# Patient Record
Sex: FEMALE | Race: WHITE | NOT HISPANIC OR LATINO | Employment: UNEMPLOYED | URBAN - METROPOLITAN AREA
[De-identification: names, ages, dates, MRNs, and addresses within clinical notes are randomized per-mention and may not be internally consistent; named-entity substitution may affect disease eponyms.]

---

## 2024-01-18 ENCOUNTER — APPOINTMENT (OUTPATIENT)
Dept: CARDIOLOGY | Facility: CLINIC | Age: 54
End: 2024-01-18
Payer: COMMERCIAL

## 2024-01-18 ENCOUNTER — TELEPHONE (OUTPATIENT)
Dept: CARDIOLOGY | Facility: CLINIC | Age: 54
End: 2024-01-18

## 2024-01-18 ENCOUNTER — OFFICE VISIT (OUTPATIENT)
Dept: CARDIOLOGY | Facility: CLINIC | Age: 54
End: 2024-01-18
Payer: COMMERCIAL

## 2024-01-18 VITALS
HEART RATE: 95 BPM | DIASTOLIC BLOOD PRESSURE: 80 MMHG | WEIGHT: 192.5 LBS | SYSTOLIC BLOOD PRESSURE: 116 MMHG | HEIGHT: 64 IN | BODY MASS INDEX: 32.87 KG/M2

## 2024-01-18 DIAGNOSIS — J44.9 CHRONIC OBSTRUCTIVE PULMONARY DISEASE, UNSPECIFIED COPD TYPE (MULTI): ICD-10-CM

## 2024-01-18 DIAGNOSIS — Z95.1 HX OF CABG: ICD-10-CM

## 2024-01-18 DIAGNOSIS — Z76.89 ESTABLISHING CARE WITH NEW DOCTOR, ENCOUNTER FOR: ICD-10-CM

## 2024-01-18 DIAGNOSIS — I10 PRIMARY HYPERTENSION: ICD-10-CM

## 2024-01-18 DIAGNOSIS — I25.110 CORONARY ARTERY DISEASE INVOLVING NATIVE CORONARY ARTERY OF NATIVE HEART WITH UNSTABLE ANGINA PECTORIS (MULTI): ICD-10-CM

## 2024-01-18 DIAGNOSIS — F17.200 CURRENT SMOKER: ICD-10-CM

## 2024-01-18 DIAGNOSIS — E11.9 TYPE 2 DIABETES MELLITUS WITHOUT COMPLICATION, UNSPECIFIED WHETHER LONG TERM INSULIN USE (MULTI): ICD-10-CM

## 2024-01-18 DIAGNOSIS — E66.9 OBESITY (BMI 30.0-34.9): ICD-10-CM

## 2024-01-18 DIAGNOSIS — E78.2 MIXED HYPERLIPIDEMIA: ICD-10-CM

## 2024-01-18 PROBLEM — I25.10 CAD (CORONARY ARTERY DISEASE): Status: ACTIVE | Noted: 2024-01-18

## 2024-01-18 PROBLEM — E78.5 HLD (HYPERLIPIDEMIA): Status: ACTIVE | Noted: 2024-01-18

## 2024-01-18 PROCEDURE — 93000 ELECTROCARDIOGRAM COMPLETE: CPT | Performed by: INTERNAL MEDICINE

## 2024-01-18 PROCEDURE — 3074F SYST BP LT 130 MM HG: CPT | Performed by: INTERNAL MEDICINE

## 2024-01-18 PROCEDURE — 3079F DIAST BP 80-89 MM HG: CPT | Performed by: INTERNAL MEDICINE

## 2024-01-18 PROCEDURE — 99205 OFFICE O/P NEW HI 60 MIN: CPT | Performed by: INTERNAL MEDICINE

## 2024-01-18 RX ORDER — METOPROLOL TARTRATE 50 MG/1
50 TABLET ORAL 2 TIMES DAILY
COMMUNITY
Start: 2024-01-11 | End: 2024-01-18 | Stop reason: SDUPTHER

## 2024-01-18 RX ORDER — INSULIN LISPRO 100 [IU]/ML
INJECTION, SOLUTION INTRAVENOUS; SUBCUTANEOUS
COMMUNITY
Start: 2023-12-08

## 2024-01-18 RX ORDER — OMEPRAZOLE 40 MG/1
40 CAPSULE, DELAYED RELEASE ORAL DAILY
COMMUNITY

## 2024-01-18 RX ORDER — IBUPROFEN 800 MG/1
800 TABLET ORAL 3 TIMES DAILY
COMMUNITY
Start: 2024-01-11

## 2024-01-18 RX ORDER — ROPINIROLE 4 MG/1
8 TABLET, FILM COATED ORAL NIGHTLY
COMMUNITY

## 2024-01-18 RX ORDER — METHOCARBAMOL 500 MG/1
500 TABLET, FILM COATED ORAL 3 TIMES DAILY PRN
COMMUNITY
Start: 2023-12-16

## 2024-01-18 RX ORDER — VENLAFAXINE HYDROCHLORIDE 150 MG/1
150 CAPSULE, EXTENDED RELEASE ORAL DAILY
COMMUNITY
Start: 2024-01-11

## 2024-01-18 RX ORDER — METOPROLOL TARTRATE 100 MG/1
100 TABLET ORAL 2 TIMES DAILY
Qty: 180 TABLET | Refills: 3 | Status: SHIPPED | OUTPATIENT
Start: 2024-01-18

## 2024-01-18 RX ORDER — NITROGLYCERIN 0.4 MG/1
0.4 TABLET SUBLINGUAL EVERY 5 MIN PRN
COMMUNITY
Start: 2024-01-12

## 2024-01-18 RX ORDER — ASPIRIN 81 MG/1
81 TABLET ORAL DAILY
COMMUNITY

## 2024-01-18 RX ORDER — CLOPIDOGREL BISULFATE 75 MG/1
75 TABLET ORAL DAILY
COMMUNITY

## 2024-01-18 RX ORDER — ISOSORBIDE MONONITRATE 60 MG/1
60 TABLET, EXTENDED RELEASE ORAL DAILY
COMMUNITY
Start: 2024-01-11 | End: 2024-01-30 | Stop reason: SDUPTHER

## 2024-01-18 RX ORDER — ONDANSETRON 4 MG/1
TABLET, FILM COATED ORAL
COMMUNITY
Start: 2023-08-31

## 2024-01-18 RX ORDER — ATORVASTATIN CALCIUM 80 MG/1
80 TABLET, FILM COATED ORAL DAILY
COMMUNITY
Start: 2024-01-11

## 2024-01-18 RX ORDER — PREGABALIN 75 MG/1
150 CAPSULE ORAL 2 TIMES DAILY
COMMUNITY
Start: 2024-01-12

## 2024-01-18 RX ORDER — PEN NEEDLE, DIABETIC 31 GX5/16"
NEEDLE, DISPOSABLE MISCELLANEOUS
COMMUNITY
Start: 2023-12-08

## 2024-01-18 RX ORDER — INSULIN GLARGINE 100 [IU]/ML
INJECTION, SOLUTION SUBCUTANEOUS
COMMUNITY
Start: 2023-11-08

## 2024-01-18 RX ORDER — RANOLAZINE 500 MG/1
TABLET, EXTENDED RELEASE ORAL
COMMUNITY
Start: 2024-01-11

## 2024-01-18 RX ORDER — HYDROXYZINE PAMOATE 25 MG/1
25 CAPSULE ORAL 3 TIMES DAILY PRN
COMMUNITY
Start: 2024-01-12 | End: 2024-02-26 | Stop reason: WASHOUT

## 2024-01-18 RX ORDER — PROMETHAZINE HYDROCHLORIDE AND DEXTROMETHORPHAN HYDROBROMIDE 6.25; 15 MG/5ML; MG/5ML
SYRUP ORAL
COMMUNITY
Start: 2023-12-05 | End: 2024-01-30 | Stop reason: WASHOUT

## 2024-01-18 RX ORDER — TRAMADOL HYDROCHLORIDE 50 MG/1
50 TABLET ORAL EVERY 12 HOURS PRN
COMMUNITY
Start: 2023-11-30

## 2024-01-18 RX ORDER — LANOLIN ALCOHOL/MO/W.PET/CERES
1 CREAM (GRAM) TOPICAL NIGHTLY
COMMUNITY
Start: 2024-01-11

## 2024-01-18 ASSESSMENT — ENCOUNTER SYMPTOMS
NEAR-SYNCOPE: 0
HEMATOLOGIC/LYMPHATIC NEGATIVE: 1
NEUROLOGICAL NEGATIVE: 1
COUGH: 0
ALLERGIC/IMMUNOLOGIC NEGATIVE: 1
PSYCHIATRIC NEGATIVE: 1
PND: 0
ORTHOPNEA: 0
GASTROINTESTINAL NEGATIVE: 1
SHORTNESS OF BREATH: 1
SPUTUM PRODUCTION: 0
SLEEP DISTURBANCES DUE TO BREATHING: 0
ENDOCRINE NEGATIVE: 1
DYSPNEA ON EXERTION: 1
CONSTITUTIONAL NEGATIVE: 1
MUSCULOSKELETAL NEGATIVE: 1
HEMOPTYSIS: 0
SNORING: 0
SYNCOPE: 0
CLAUDICATION: 0
PALPITATIONS: 1
EYES NEGATIVE: 1
WHEEZING: 0

## 2024-01-18 NOTE — TELEPHONE ENCOUNTER
She also had a CABG at [facility] in [facility].  That was in 8/2023. Please also get those as well. Thank you.

## 2024-01-18 NOTE — PATIENT INSTRUCTIONS
Continue same medications/treatment.  Patient educated on proper medication use.  Patient educated on risk factor modification.  Please bring any lab results from other providers/physicians to your next appointment.    Please bring all medicines, vitamins, and herbal supplements with you when you come to the office.    Prescriptions will not be filled unless you are compliant with your follow up appointments or have a follow up appointment scheduled as per instruction of your physician. Refills should be requested at the time of your visit.    Follow up on 1/30/24 with Dr. Jonathon Renner DO to review records from previous [facility] stay  INCREASE Metoprolol to 100 mg 2x daily.     ITANJA RN, AM SCRIBING FOR AND IN THE PRESENCE OF DR. JONATHON RENNER DO, FACC

## 2024-01-18 NOTE — PROGRESS NOTES
CARDIOLOGY NEW PATIENT OFFICE VISIT    Patient:  Marcie Almazan  YOB: 1970  MRN: [id number]       Chief Complaint/Active Symptoms:       Marcie Almazan is a 53 y.o. female who is being seen today at the request of self for evaluation of CAD, recent CABG 8/2023.    Chief Complaint   Patient presents with    New Patient Visit     Due to family history       History of Present Illness:   Is a 53-year-old female who we have never seen before here in [facility] although she does have a an extensive cardiovascular history.  She states that back in 2017 she had a heart attack and ultimately underwent some type of procedures involving stents.  Over the next 6 years she underwent a total of 7 stents.  She is diabetic hypertensive hyperlipidemic and obese.  More recently in August 2023 she underwent 5 vessel bypass at [facility] in [place].  I do not have any of these details or information.  She was just hospitalized that after the new year at her local hospital because of angina again.  She sat there for several days waiting for transfer but could not take place so she underwent heart cath and states 2 stents were placed.  She also had an echo and states that they told her that her ejection fraction was about 35% and that she needed a valve repaired.  Patient decided to come here since she has relatives in this immediate area.  I used to take care of one of her sisters.  Patient still has ongoing angina although EKG is essentially normal.  She is on a whole host of medicines including beta-blockers aspirin Plavix Ranexa Imdur.  She is also on insulin and other medications as well and she does take high-dose statin but is not on an ACE or ARB at this time.  She appears to be clinically stable.  Again EKG shows sinus rhythm with no significant ischemic changes.              Allergies:     Allergies   Allergen Reactions    Clomipramine Diarrhea    Coconut Hives    Glimepiride Diarrhea     "Buspar [Buspirone] Rash        Outpatient Medications:     Current Outpatient Medications   Medication Instructions    aspirin 81 mg, oral, Daily    atorvastatin (LIPITOR) 80 mg, oral, Daily    BD Ultra-Fine Short Pen Needle 31 gauge x 5/16\" needle USE AS DIRECTED    clopidogrel (PLAVIX) 75 mg, oral, Daily    HumaLOG KwikPen Insulin 100 unit/mL injection As directed    hydrOXYzine pamoate (VISTARIL) 25 mg, oral, 3 times daily PRN    ibuprofen 800 mg, oral, 3 times daily, PRN    isosorbide mononitrate ER (IMDUR) 60 mg, oral, Daily    Lantus Solostar U-100 Insulin 100 unit/mL (3 mL) pen As directed    magnesium oxide (Mag-Ox) 400 mg (241.3 mg magnesium) tablet 1 tablet, oral, Nightly    methocarbamol (ROBAXIN) 500 mg, oral, 3 times daily PRN    metoprolol tartrate (LOPRESSOR) 50 mg, oral, 2 times daily    nitroglycerin (NITROSTAT) 0.4 mg, sublingual, Every 5 min PRN    omeprazole (PRILOSEC) 40 mg, oral, Daily    ondansetron (Zofran) 4 mg tablet TAKE 1 TAB BY MOUTH EVERY 6 HOURS A NEEDED    pregabalin (LYRICA) 75 mg, oral, 2 times daily    promethazine-DM (Phenergan-DM) 6.25-15 mg/5 mL syrup TAKE 2 TSP BY MOUTH EVERY 6 HOURS AS NEEDED    ranolazine (Ranexa) 500 mg 12 hr tablet TAKE 1 TABLET EXTENDED RELEASE 12 HOURS BY MOUTH TWICE A DAY    rOPINIRole (REQUIP) 8 mg, oral, Nightly    traMADol (ULTRAM) 50 mg, oral, Every 12 hours PRN    venlafaxine XR (EFFEXOR-XR) 150 mg, oral, Daily        Past Medical History:     No past medical history on file.    Social History:     Social History     Tobacco Use    Smoking status: Every Day     Packs/day: 1.00     Years: 42.00     Additional pack years: 0.00     Total pack years: 42.00     Types: Cigarettes     Start date: 1981    Smokeless tobacco: Never   Substance Use Topics    Alcohol use: Not Currently    Drug use: Not Currently       Family History:        Family History   Problem Relation Name Age of Onset    Lung cancer Mother         Review of Systems:     Review of Systems "   Constitutional: Negative.   HENT: Negative.     Eyes: Negative.    Cardiovascular:  Positive for chest pain, dyspnea on exertion and palpitations. Negative for claudication, cyanosis, near-syncope, orthopnea, paroxysmal nocturnal dyspnea and syncope.   Respiratory:  Positive for shortness of breath. Negative for cough, hemoptysis, sleep disturbances due to breathing, snoring, sputum production and wheezing.    Endocrine: Negative.    Hematologic/Lymphatic: Negative.    Skin: Negative.    Musculoskeletal: Negative.    Gastrointestinal: Negative.    Genitourinary: Negative.    Neurological: Negative.    Psychiatric/Behavioral: Negative.     Allergic/Immunologic: Negative.        Objective:     Vitals:    24 1249   BP: 116/80   Pulse: 95       Vitals:    24 1249   Weight: 87.3 kg (192 lb 8 oz)       Physical Examination:   Constitutional:       Appearance: Healthy appearance. Not in distress.   Neck:      Vascular: No JVR. JVD normal.   Pulmonary:      Effort: Pulmonary effort is normal.      Breath sounds: Normal breath sounds. No wheezing. No rhonchi. No rales.   Chest:      Chest wall: Not tender to palpatation.      Comments: Well-healed sternotomy scar noted  Cardiovascular:      PMI at left midclavicular line. Normal rate. Regular rhythm. Normal S1. Normal S2.       Murmurs: There is a grade 1/6 blowing midsystolic murmur at the LLSB, LRSB and ULSB.      No gallop.  No click. No rub.   Pulses:     Intact distal pulses.   Edema:     Peripheral edema absent.   Abdominal:      General: Bowel sounds are normal.      Palpations: Abdomen is soft.      Tenderness: There is no abdominal tenderness.   Musculoskeletal: Normal range of motion.         General: No tenderness. Skin:     General: Skin is warm and dry.   Neurological:      General: No focal deficit present.      Mental Status: Alert and oriented to person, place and time.            Lab:           Diagnostic Studies:     EK normal sinus  rhythm  Borderline left atrial lodgment  Essentially normal tracing    Elbert Renner DO,Merged with [facility]      Radiology:     No orders to display       Assessment/Plan:   53-year-old female seen and evaluated today at her own request due to advanced cardiovascular disease for additional opinion and management.    Meds, vitals, examination as noted.    Chart reviewed in detail and discussed with the patient and family.    Impression:  ASHD class III  Remote multivessel angioplasty and stenting since 2017  Recent 5 vessel CABG [facility] in [place] August 2023 details not known  Reported ischemic cardiomyopathy with 35% ejection fraction  Reported valve disease details unknown  Insulin-dependent diabetes mellitus  Obesity  Hypertension  Hyperlipidemia        Assessment:         Plan:   Recommendations:  Increase metoprolol to 100 twice daily  Have requested records and will try and load discs with recent cath intervention and echo reports to review  Patient is planning to return back to [facility] and return here in 10 to 12 days for follow-up  Any major condition change she will need to seek emergency medical attention at her local hospital  Have requested all records from both the surgical hospital in her local hospital for all procedures and cardiac management and events  May plan to increase her Ranexa if necessary and also consider adding Entresto based on imaging information  If necessary we will repeat cardiac studies here    Discussion:  This is a 53-year-old female with extensive cardiovascular history however we do not have any detailed information.  She is a good historian and undoubtedly has substantial amount of issues.  We are planning to review her recent studies if this is able to be loaded into our system at the hospital.  In addition we can adjust her medications as described and then have her return to review and decide if any additional testing or procedures will be necessary while in  [facility].  She is instructed to go to her local emergency room should she have any unstable problems.  Fortunately EKG today and examination today are relatively benign.  I reviewed this in great detail and the patient and family are aware of our recommendations and plans.

## 2024-01-29 ENCOUNTER — APPOINTMENT (OUTPATIENT)
Dept: CARDIOLOGY | Facility: HOSPITAL | Age: 54
End: 2024-01-29
Payer: COMMERCIAL

## 2024-01-29 ENCOUNTER — HOSPITAL ENCOUNTER (EMERGENCY)
Facility: HOSPITAL | Age: 54
Discharge: HOME | End: 2024-01-29
Attending: STUDENT IN AN ORGANIZED HEALTH CARE EDUCATION/TRAINING PROGRAM
Payer: COMMERCIAL

## 2024-01-29 ENCOUNTER — APPOINTMENT (OUTPATIENT)
Dept: RADIOLOGY | Facility: HOSPITAL | Age: 54
End: 2024-01-29
Payer: COMMERCIAL

## 2024-01-29 VITALS
DIASTOLIC BLOOD PRESSURE: 78 MMHG | BODY MASS INDEX: 31.62 KG/M2 | HEART RATE: 68 BPM | SYSTOLIC BLOOD PRESSURE: 154 MMHG | HEIGHT: 64 IN | WEIGHT: 185.19 LBS | TEMPERATURE: 97 F | RESPIRATION RATE: 16 BRPM | OXYGEN SATURATION: 97 %

## 2024-01-29 DIAGNOSIS — R07.9 CHEST PAIN, UNSPECIFIED TYPE: Primary | ICD-10-CM

## 2024-01-29 LAB
ALBUMIN SERPL BCP-MCNC: 3.8 G/DL (ref 3.4–5)
ALP SERPL-CCNC: 100 U/L (ref 33–110)
ALT SERPL W P-5'-P-CCNC: 5 U/L (ref 7–45)
ANION GAP SERPL CALC-SCNC: 15 MMOL/L (ref 10–20)
AST SERPL W P-5'-P-CCNC: 8 U/L (ref 9–39)
B-HCG SERPL-ACNC: <2 MIU/ML
BASOPHILS # BLD AUTO: 0.09 X10*3/UL (ref 0–0.1)
BASOPHILS NFR BLD AUTO: 0.9 %
BILIRUB SERPL-MCNC: 0.4 MG/DL (ref 0–1.2)
BNP SERPL-MCNC: 130 PG/ML (ref 0–99)
BUN SERPL-MCNC: 8 MG/DL (ref 6–23)
CALCIUM SERPL-MCNC: 8.8 MG/DL (ref 8.6–10.3)
CARDIAC TROPONIN I PNL SERPL HS: 3 NG/L (ref 0–13)
CARDIAC TROPONIN I PNL SERPL HS: 4 NG/L (ref 0–13)
CHLORIDE SERPL-SCNC: 101 MMOL/L (ref 98–107)
CO2 SERPL-SCNC: 24 MMOL/L (ref 21–32)
CREAT SERPL-MCNC: 0.64 MG/DL (ref 0.5–1.05)
EGFRCR SERPLBLD CKD-EPI 2021: >90 ML/MIN/1.73M*2
EOSINOPHIL # BLD AUTO: 0.25 X10*3/UL (ref 0–0.7)
EOSINOPHIL NFR BLD AUTO: 2.4 %
ERYTHROCYTE [DISTWIDTH] IN BLOOD BY AUTOMATED COUNT: 14 % (ref 11.5–14.5)
FLUAV RNA RESP QL NAA+PROBE: NOT DETECTED
FLUBV RNA RESP QL NAA+PROBE: NOT DETECTED
GLUCOSE SERPL-MCNC: 171 MG/DL (ref 74–99)
HCT VFR BLD AUTO: 39.9 % (ref 36–46)
HGB BLD-MCNC: 13.6 G/DL (ref 12–16)
HOLD SPECIMEN: NORMAL
IMM GRANULOCYTES # BLD AUTO: 0.03 X10*3/UL (ref 0–0.7)
IMM GRANULOCYTES NFR BLD AUTO: 0.3 % (ref 0–0.9)
LYMPHOCYTES # BLD AUTO: 3.34 X10*3/UL (ref 1.2–4.8)
LYMPHOCYTES NFR BLD AUTO: 31.7 %
MAGNESIUM SERPL-MCNC: 1.6 MG/DL (ref 1.6–2.4)
MCH RBC QN AUTO: 29.9 PG (ref 26–34)
MCHC RBC AUTO-ENTMCNC: 34.1 G/DL (ref 32–36)
MCV RBC AUTO: 88 FL (ref 80–100)
MONOCYTES # BLD AUTO: 0.42 X10*3/UL (ref 0.1–1)
MONOCYTES NFR BLD AUTO: 4 %
NEUTROPHILS # BLD AUTO: 6.41 X10*3/UL (ref 1.2–7.7)
NEUTROPHILS NFR BLD AUTO: 60.7 %
NRBC BLD-RTO: 0 /100 WBCS (ref 0–0)
PLATELET # BLD AUTO: 183 X10*3/UL (ref 150–450)
POTASSIUM SERPL-SCNC: 3.9 MMOL/L (ref 3.5–5.3)
PROT SERPL-MCNC: 6.9 G/DL (ref 6.4–8.2)
RBC # BLD AUTO: 4.55 X10*6/UL (ref 4–5.2)
SARS-COV-2 RNA RESP QL NAA+PROBE: NOT DETECTED
SODIUM SERPL-SCNC: 136 MMOL/L (ref 136–145)
WBC # BLD AUTO: 10.5 X10*3/UL (ref 4.4–11.3)

## 2024-01-29 PROCEDURE — 85025 COMPLETE CBC W/AUTO DIFF WBC: CPT | Performed by: STUDENT IN AN ORGANIZED HEALTH CARE EDUCATION/TRAINING PROGRAM

## 2024-01-29 PROCEDURE — 71046 X-RAY EXAM CHEST 2 VIEWS: CPT

## 2024-01-29 PROCEDURE — 99284 EMERGENCY DEPT VISIT MOD MDM: CPT | Performed by: STUDENT IN AN ORGANIZED HEALTH CARE EDUCATION/TRAINING PROGRAM

## 2024-01-29 PROCEDURE — 84484 ASSAY OF TROPONIN QUANT: CPT | Performed by: STUDENT IN AN ORGANIZED HEALTH CARE EDUCATION/TRAINING PROGRAM

## 2024-01-29 PROCEDURE — 96374 THER/PROPH/DIAG INJ IV PUSH: CPT

## 2024-01-29 PROCEDURE — 93005 ELECTROCARDIOGRAM TRACING: CPT | Mod: 59

## 2024-01-29 PROCEDURE — 83735 ASSAY OF MAGNESIUM: CPT | Performed by: STUDENT IN AN ORGANIZED HEALTH CARE EDUCATION/TRAINING PROGRAM

## 2024-01-29 PROCEDURE — 93005 ELECTROCARDIOGRAM TRACING: CPT

## 2024-01-29 PROCEDURE — 83880 ASSAY OF NATRIURETIC PEPTIDE: CPT | Performed by: STUDENT IN AN ORGANIZED HEALTH CARE EDUCATION/TRAINING PROGRAM

## 2024-01-29 PROCEDURE — 84702 CHORIONIC GONADOTROPIN TEST: CPT | Performed by: STUDENT IN AN ORGANIZED HEALTH CARE EDUCATION/TRAINING PROGRAM

## 2024-01-29 PROCEDURE — 84075 ASSAY ALKALINE PHOSPHATASE: CPT | Performed by: STUDENT IN AN ORGANIZED HEALTH CARE EDUCATION/TRAINING PROGRAM

## 2024-01-29 PROCEDURE — 96361 HYDRATE IV INFUSION ADD-ON: CPT

## 2024-01-29 PROCEDURE — 96375 TX/PRO/DX INJ NEW DRUG ADDON: CPT

## 2024-01-29 PROCEDURE — 36415 COLL VENOUS BLD VENIPUNCTURE: CPT | Performed by: STUDENT IN AN ORGANIZED HEALTH CARE EDUCATION/TRAINING PROGRAM

## 2024-01-29 PROCEDURE — 2500000004 HC RX 250 GENERAL PHARMACY W/ HCPCS (ALT 636 FOR OP/ED): Performed by: STUDENT IN AN ORGANIZED HEALTH CARE EDUCATION/TRAINING PROGRAM

## 2024-01-29 PROCEDURE — 71046 X-RAY EXAM CHEST 2 VIEWS: CPT | Performed by: RADIOLOGY

## 2024-01-29 PROCEDURE — 87636 SARSCOV2 & INF A&B AMP PRB: CPT | Performed by: STUDENT IN AN ORGANIZED HEALTH CARE EDUCATION/TRAINING PROGRAM

## 2024-01-29 RX ORDER — LORAZEPAM 2 MG/ML
0.5 INJECTION INTRAMUSCULAR ONCE
Status: COMPLETED | OUTPATIENT
Start: 2024-01-29 | End: 2024-01-29

## 2024-01-29 RX ORDER — SODIUM CHLORIDE 9 MG/ML
100 INJECTION, SOLUTION INTRAVENOUS CONTINUOUS
Status: DISCONTINUED | OUTPATIENT
Start: 2024-01-29 | End: 2024-01-29 | Stop reason: HOSPADM

## 2024-01-29 RX ORDER — ONDANSETRON HYDROCHLORIDE 2 MG/ML
4 INJECTION, SOLUTION INTRAVENOUS ONCE
Status: COMPLETED | OUTPATIENT
Start: 2024-01-29 | End: 2024-01-29

## 2024-01-29 RX ORDER — SODIUM CHLORIDE 9 MG/ML
3 INJECTION, SOLUTION INTRAMUSCULAR; INTRAVENOUS; SUBCUTANEOUS AS NEEDED
Status: DISCONTINUED | OUTPATIENT
Start: 2024-01-29 | End: 2024-01-29 | Stop reason: HOSPADM

## 2024-01-29 RX ADMIN — HYDROMORPHONE HYDROCHLORIDE 0.2 MG: 0.2 INJECTION, SOLUTION INTRAMUSCULAR; INTRAVENOUS; SUBCUTANEOUS at 13:24

## 2024-01-29 RX ADMIN — ONDANSETRON 4 MG: 2 INJECTION INTRAMUSCULAR; INTRAVENOUS at 13:22

## 2024-01-29 RX ADMIN — LORAZEPAM 0.5 MG: 2 INJECTION, SOLUTION INTRAMUSCULAR; INTRAVENOUS at 14:14

## 2024-01-29 RX ADMIN — SODIUM CHLORIDE 100 ML/HR: 9 INJECTION, SOLUTION INTRAVENOUS at 14:14

## 2024-01-29 ASSESSMENT — HEART SCORE
HEART SCORE: 3
HISTORY: SLIGHTLY SUSPICIOUS
RISK FACTORS: >2 RISK FACTORS OR HX OF ATHEROSCLEROTIC DISEASE
ECG: NORMAL
AGE: 45-64
TROPONIN: LESS THAN OR EQUAL TO NORMAL LIMIT

## 2024-01-29 ASSESSMENT — LIFESTYLE VARIABLES
HAVE PEOPLE ANNOYED YOU BY CRITICIZING YOUR DRINKING: NO
REASON UNABLE TO ASSESS: NO
EVER FELT BAD OR GUILTY ABOUT YOUR DRINKING: NO
HAVE PEOPLE ANNOYED YOU BY CRITICIZING YOUR DRINKING: NO
EVER HAD A DRINK FIRST THING IN THE MORNING TO STEADY YOUR NERVES TO GET RID OF A HANGOVER: NO
EVER HAD A DRINK FIRST THING IN THE MORNING TO STEADY YOUR NERVES TO GET RID OF A HANGOVER: NO
HAVE YOU EVER FELT YOU SHOULD CUT DOWN ON YOUR DRINKING: NO
HAVE YOU EVER FELT YOU SHOULD CUT DOWN ON YOUR DRINKING: NO
EVER FELT BAD OR GUILTY ABOUT YOUR DRINKING: NO
REASON UNABLE TO ASSESS: NO

## 2024-01-29 ASSESSMENT — PAIN SCALES - GENERAL
PAINLEVEL_OUTOF10: 8
PAINLEVEL_OUTOF10: 1
PAINLEVEL_OUTOF10: 9
PAINLEVEL_OUTOF10: 9

## 2024-01-29 ASSESSMENT — COLUMBIA-SUICIDE SEVERITY RATING SCALE - C-SSRS
1. IN THE PAST MONTH, HAVE YOU WISHED YOU WERE DEAD OR WISHED YOU COULD GO TO SLEEP AND NOT WAKE UP?: NO
6. HAVE YOU EVER DONE ANYTHING, STARTED TO DO ANYTHING, OR PREPARED TO DO ANYTHING TO END YOUR LIFE?: NO
2. HAVE YOU ACTUALLY HAD ANY THOUGHTS OF KILLING YOURSELF?: NO

## 2024-01-29 ASSESSMENT — PAIN - FUNCTIONAL ASSESSMENT
PAIN_FUNCTIONAL_ASSESSMENT: 0-10
PAIN_FUNCTIONAL_ASSESSMENT: 0-10

## 2024-01-29 NOTE — ED PROVIDER NOTES
"HPI   Chief Complaint   Patient presents with    Chest Pain     'Here for chest pain that started about an hour ago.\"       Patient is a 53-year-old female presenting to the emergency department with complaints of chest pain.  Patient states that she has had intermittent episodes of chest pain for the past 3 weeks.  Patient did see cardiology Dr. Renner who made some adjustments to her medications which initially resolved her discomfort.  Patient states that approximately an hour ago she started having chest pain starts in the left side of her chest and radiates over to the right that is described as an sharp aching sensation with no exacerbating or alleviating factors.  Patient attempted to take 3 of her own home nitro tablets without any relief.  She denies any fevers, chills, difficulty breathing, productive cough, abdominal pain, nausea/vomiting, changes in bowel or bladder habits, syncopal episodes, falls or traumatic injuries.      History provided by:  Patient and medical records   used: No                        [place] Coma Scale Score: 15   HEART Score: 3                Patient History   History reviewed. No pertinent past medical history.  Past Surgical History:   Procedure Laterality Date    CARDIAC CATHETERIZATION Left     CHOLECYSTECTOMY      CORONARY ANGIOPLASTY WITH STENT PLACEMENT Left     CORONARY ARTERY BYPASS GRAFT  08/2023    X5    TOTAL ABDOMINAL HYSTERECTOMY       Family History   Problem Relation Name Age of Onset    Lung cancer Mother       Social History     Tobacco Use    Smoking status: Every Day     Packs/day: 1.00     Years: 42.00     Additional pack years: 0.00     Total pack years: 42.00     Types: Cigarettes     Start date: 1981    Smokeless tobacco: Never   Vaping Use    Vaping Use: Former   Substance Use Topics    Alcohol use: Not Currently    Drug use: Not Currently       Physical Exam   ED Triage Vitals [01/29/24 1220]   Temperature Heart Rate Respirations BP "   36.1 °C (97 °F) 70 18 146/88      Pulse Ox Temp Source Heart Rate Source Patient Position   100 % Tympanic Monitor Sitting      BP Location FiO2 (%)     Right arm --       Physical Exam  Vitals and nursing note reviewed.   Constitutional:       General: She is not in acute distress.     Appearance: Normal appearance. She is not ill-appearing, toxic-appearing or diaphoretic.   HENT:      Head: Normocephalic and atraumatic.      Nose: Nose normal.      Mouth/Throat:      Mouth: Mucous membranes are moist.      Pharynx: No oropharyngeal exudate or posterior oropharyngeal erythema.   Eyes:      General: No scleral icterus.     Extraocular Movements: Extraocular movements intact.      Pupils: Pupils are equal, round, and reactive to light.   Cardiovascular:      Rate and Rhythm: Normal rate and regular rhythm.      Pulses: Normal pulses.      Heart sounds: Normal heart sounds. No murmur heard.     No friction rub. No gallop.   Pulmonary:      Effort: Pulmonary effort is normal. No respiratory distress.      Breath sounds: Normal breath sounds. No stridor. No wheezing, rhonchi or rales.   Chest:      Chest wall: No tenderness.   Abdominal:      General: Abdomen is flat. There is no distension.      Palpations: Abdomen is soft. There is no mass.      Tenderness: There is no abdominal tenderness. There is no guarding.      Hernia: No hernia is present.   Musculoskeletal:         General: No swelling, tenderness, deformity or signs of injury. Normal range of motion.      Cervical back: Normal range of motion and neck supple. No rigidity.   Skin:     General: Skin is warm and dry.      Capillary Refill: Capillary refill takes less than 2 seconds.      Coloration: Skin is not jaundiced or pale.      Findings: No bruising, erythema, lesion or rash.   Neurological:      General: No focal deficit present.      Mental Status: She is alert and oriented to person, place, and time. Mental status is at baseline.   Psychiatric:          Mood and Affect: Mood normal.         Behavior: Behavior normal.         ED Course & MDM   Diagnoses as of 01/29/24 1804   Chest pain, unspecified type       Medical Decision Making  Patient is a 53-year-old female with extensive medical history including CABG and 9 stents who presents emergency department with complaints of chest pain.  Patient is from out of town and does not have any previous cardiac records in our system.  Patient took nitro without relief.  Patient is slightly anxious and concerned that she is having a cardiac issue which is why she presents emergency department today.  Cardiac evaluation was ordered.    Patient had resolution of her symptoms after being medicated.  CBC and CMP reviewed without concerning findings.  Troponins negative x 2.  Pregnancy was negative.  BNP indeterminately elevated at 130.  Patient does not appear volume overloaded.  Magnesium levels normal.  COVID and influenza were negative.  Chest x-ray showed no acute cardiopulmonary processes.  EKG showed no acute ischemic injury pattern.  Patient is a heart score 3.  Patient was advised of her laboratory and imaging findings and discussion of admission versus discharge and cardiology follow-up outpatient was had.  With shared decision making the patient will be discharged follow-up with her cardiologist.  Return precautions were given.  All questions were answered.  Patient was appreciative care and agreeable to discharge.    Amount and/or Complexity of Data Reviewed  Labs: ordered. Decision-making details documented in ED Course.  Radiology: ordered. Decision-making details documented in ED Course.  ECG/medicine tests: independent interpretation performed.     Details: 1219 hrs: Normal sinus rhythm with a ventricular rate of 70 bpm.  QRS interval 70 ms.  QTc 436 ms.  No acute ischemic injury pattern appreciated.  1358 hrs.: Sinus rhythm with a ventricular rate of 66 bpm.  QRS interval 76 ms.  QTc 446 ms.  Normal axis.  No  acute ischemic injury pattern noted.  1549 hrs.: Sinus rhythm with a ventricular rate of 65 bpm.  Questionable 84 ms.  QTc 455 ms.  No acute ischemic injury pattern noted.      Labs Reviewed   COMPREHENSIVE METABOLIC PANEL - Abnormal       Result Value    Glucose 171 (*)     Sodium 136      Potassium 3.9      Chloride 101      Bicarbonate 24      Anion Gap 15      Urea Nitrogen 8      Creatinine 0.64      eGFR >90      Calcium 8.8      Albumin 3.8      Alkaline Phosphatase 100      Total Protein 6.9      AST 8 (*)     Bilirubin, Total 0.4      ALT 5 (*)    B-TYPE NATRIURETIC PEPTIDE - Abnormal     (*)     Narrative:        <100 pg/mL - Heart failure unlikely  100-299 pg/mL - Intermediate probability of acute heart                  failure exacerbation. Correlate with clinical                  context and patient history.    >=300 pg/mL - Heart Failure likely. Correlate with clinical                  context and patient history.    BNP testing is performed using different testing methodology at [facility] than at other [facility]. Direct result comparisons should only be made within the same method.      MAGNESIUM - Normal    Magnesium 1.60     HUMAN CHORIONIC GONADOTROPIN, SERUM QUANTITATIVE - Normal    HCG, Beta-Quantitative <2      Narrative:      Total HCG measurement is performed using the [employer] Access   Immunoassay which detects intact HCG and free beta HCG subunit.    This test is not indicated for use as a tumor marker.   HCG testing is performed using a different test methodology at [facility] than other [facility]. Direct result comparison   should only be made within the same method.       SARS-COV-2 AND INFLUENZA A/B PCR - Normal    Flu A Result Not Detected      Flu B Result Not Detected      Coronavirus 2019, PCR Not Detected      Narrative:     This assay has received FDA Emergency Use Authorization (EUA) and  is only authorized for the  duration of time that circumstances exist to justify the authorization of the emergency use of in vitro diagnostic tests for the detection of SARS-CoV-2 virus and/or diagnosis of COVID-19 infection under section 564(b)(1) of the Act, 21 U.S.C. 360bbb-3(b)(1). Testing for SARS-CoV-2 is only recommended for patients who meet current clinical and/or epidemiological criteria as defined by federal, state, or local public health directives. This assay is an in vitro diagnostic nucleic acid amplification test for the qualitative detection of SARS-CoV-2, Influenza A, and Influenza B from nasopharyngeal specimens and has been validated for use at [facility]. Negative results do not preclude COVID-19 infections or Influenza A/B infections, and should not be used as the sole basis for diagnosis, treatment, or other management decisions. If Influenza A/B and RSV PCR results are negative, testing for Parainfluenza virus, Adenovirus and Metapneumovirus is routinely performed for [facility] pediatric oncology and intensive care inpatients, and is available on other patients by placing an add-on request.    SERIAL TROPONIN-INITIAL - Normal    Troponin I, High Sensitivity 3      Narrative:     Less than 99th percentile of normal range cutoff-  Female and children under 18 years old <14 ng/L; Male <21 ng/L: Negative  Repeat testing should be performed if clinically indicated.     Female and children under 18 years old 14-50 ng/L; Male 21-50 ng/L:  Consistent with possible cardiac damage and possible increased clinical   risk. Serial measurements may help to assess extent of myocardial damage.     >50 ng/L: Consistent with cardiac damage, increased clinical risk and  myocardial infarction. Serial measurements may help assess extent of   myocardial damage.      NOTE: Children less than 1 year old may have higher baseline troponin   levels and results should be interpreted in conjunction with the overall   clinical  context.     NOTE: Troponin I testing is performed using a different   testing methodology at [facility] than at other   [facility]. Direct result comparisons should only   be made within the same method.   SERIAL TROPONIN, 1 HOUR - Normal    Troponin I, High Sensitivity 4      Narrative:     Less than 99th percentile of normal range cutoff-  Female and children under 18 years old <14 ng/L; Male <21 ng/L: Negative  Repeat testing should be performed if clinically indicated.     Female and children under 18 years old 14-50 ng/L; Male 21-50 ng/L:  Consistent with possible cardiac damage and possible increased clinical   risk. Serial measurements may help to assess extent of myocardial damage.     >50 ng/L: Consistent with cardiac damage, increased clinical risk and  myocardial infarction. Serial measurements may help assess extent of   myocardial damage.      NOTE: Children less than 1 year old may have higher baseline troponin   levels and results should be interpreted in conjunction with the overall   clinical context.     NOTE: Troponin I testing is performed using a different   testing methodology at [facility] than at other   [facility]. Direct result comparisons should only   be made within the same method.   TROPONIN SERIES- (INITIAL, 1 HR)    Narrative:     The following orders were created for panel order Troponin I Series, High Sensitivity (0, 1 HR).  Procedure                               Abnormality         Status                     ---------                               -----------         ------                     Troponin I, High Sensiti...[[id number]]  Normal              Final result               Troponin, High Sensitivi...[[id number]]  Normal              Final result                 Please view results for these tests on the individual orders.   CBC WITH AUTO DIFFERENTIAL    WBC 10.5      nRBC 0.0      RBC 4.55      Hemoglobin 13.6      Hematocrit 39.9       MCV 88      MCH 29.9      MCHC 34.1      RDW 14.0      Platelets 183      Neutrophils % 60.7      Immature Granulocytes %, Automated 0.3      Lymphocytes % 31.7      Monocytes % 4.0      Eosinophils % 2.4      Basophils % 0.9      Neutrophils Absolute 6.41      Immature Granulocytes Absolute, Automated 0.03      Lymphocytes Absolute 3.34      Monocytes Absolute 0.42      Eosinophils Absolute 0.25      Basophils Absolute 0.09     GRAY TOP    Extra Tube Hold for add-ons.       XR chest 2 views   Final Result   No active disease in the chest.             Signed by: Skip Ontiveros 1/29/2024 1:48 PM   Dictation workstation:   [id number]            Procedure  Procedures     Marcos Easton DO  01/29/24 7710

## 2024-01-29 NOTE — DISCHARGE INSTRUCTIONS
Please follow up with your Primary Care Provider (PCP) and/or cardiologist within the next 2-3 days regarding your ED visit.  Seek immediate medical attention if you develop: worsening chest pain, new chest pain, nausea, vomiting, weakness, numbness, tingling, excessive sweating, shortness of breath, difficulty breathing, loss of motion in your arms or legs, or any new or worsening symptoms.  These documents have a lot of useful information! Please read them, so you know what to expect, what you can do for yourself at home, and also to know concerning signs warrant a return to the Emergency Department for additional evaluation.  You are welcome back any time. Thank you for entrusting your care to us, I hope we made your visit as pleasant as possible. Wishing you well!    Dr. Easton

## 2024-01-30 ENCOUNTER — OFFICE VISIT (OUTPATIENT)
Dept: CARDIOLOGY | Facility: CLINIC | Age: 54
End: 2024-01-30
Payer: COMMERCIAL

## 2024-01-30 ENCOUNTER — TELEPHONE (OUTPATIENT)
Dept: CARDIOLOGY | Facility: CLINIC | Age: 54
End: 2024-01-30

## 2024-01-30 VITALS
HEIGHT: 65 IN | HEART RATE: 70 BPM | BODY MASS INDEX: 32.92 KG/M2 | WEIGHT: 197.6 LBS | DIASTOLIC BLOOD PRESSURE: 68 MMHG | SYSTOLIC BLOOD PRESSURE: 124 MMHG

## 2024-01-30 DIAGNOSIS — I10 PRIMARY HYPERTENSION: ICD-10-CM

## 2024-01-30 DIAGNOSIS — E78.2 MIXED HYPERLIPIDEMIA: ICD-10-CM

## 2024-01-30 DIAGNOSIS — I25.110 CORONARY ARTERY DISEASE INVOLVING NATIVE CORONARY ARTERY OF NATIVE HEART WITH UNSTABLE ANGINA PECTORIS (MULTI): ICD-10-CM

## 2024-01-30 DIAGNOSIS — E66.9 OBESITY (BMI 30.0-34.9): ICD-10-CM

## 2024-01-30 DIAGNOSIS — Z95.1 HX OF CABG: ICD-10-CM

## 2024-01-30 DIAGNOSIS — E11.9 TYPE 2 DIABETES MELLITUS WITHOUT COMPLICATION, UNSPECIFIED WHETHER LONG TERM INSULIN USE (MULTI): ICD-10-CM

## 2024-01-30 DIAGNOSIS — F17.200 CURRENT SMOKER: ICD-10-CM

## 2024-01-30 DIAGNOSIS — R07.9 CHEST PAIN, UNSPECIFIED TYPE: ICD-10-CM

## 2024-01-30 DIAGNOSIS — I25.5 CARDIOMYOPATHY, ISCHEMIC: ICD-10-CM

## 2024-01-30 LAB
ATRIAL RATE: 65 BPM
ATRIAL RATE: 66 BPM
ATRIAL RATE: 70 BPM
P AXIS: 39 DEGREES
P AXIS: 42 DEGREES
P AXIS: 46 DEGREES
P OFFSET: 199 MS
P OFFSET: 201 MS
P OFFSET: 205 MS
P ONSET: 155 MS
P ONSET: 158 MS
P ONSET: 160 MS
PR INTERVAL: 130 MS
PR INTERVAL: 132 MS
PR INTERVAL: 138 MS
Q ONSET: 224 MS
Q ONSET: 224 MS
Q ONSET: 225 MS
QRS COUNT: 11 BEATS
QRS DURATION: 70 MS
QRS DURATION: 76 MS
QRS DURATION: 84 MS
QT INTERVAL: 404 MS
QT INTERVAL: 426 MS
QT INTERVAL: 438 MS
QTC CALCULATION(BAZETT): 436 MS
QTC CALCULATION(BAZETT): 446 MS
QTC CALCULATION(BAZETT): 455 MS
QTC FREDERICIA: 425 MS
QTC FREDERICIA: 440 MS
QTC FREDERICIA: 449 MS
R AXIS: -4 DEGREES
R AXIS: -4 DEGREES
R AXIS: 0 DEGREES
T AXIS: 41 DEGREES
T AXIS: 44 DEGREES
T AXIS: 58 DEGREES
T OFFSET: 427 MS
T OFFSET: 437 MS
T OFFSET: 443 MS
VENTRICULAR RATE: 65 BPM
VENTRICULAR RATE: 66 BPM
VENTRICULAR RATE: 70 BPM

## 2024-01-30 PROCEDURE — 99214 OFFICE O/P EST MOD 30 MIN: CPT | Performed by: INTERNAL MEDICINE

## 2024-01-30 PROCEDURE — 3078F DIAST BP <80 MM HG: CPT | Performed by: INTERNAL MEDICINE

## 2024-01-30 PROCEDURE — 3074F SYST BP LT 130 MM HG: CPT | Performed by: INTERNAL MEDICINE

## 2024-01-30 RX ORDER — CLONAZEPAM 0.5 MG/1
0.5 TABLET ORAL 2 TIMES DAILY PRN
COMMUNITY

## 2024-01-30 RX ORDER — ISOSORBIDE MONONITRATE 120 MG/1
120 TABLET, EXTENDED RELEASE ORAL DAILY
Qty: 100 TABLET | Refills: 3 | Status: SHIPPED | OUTPATIENT
Start: 2024-01-30

## 2024-01-30 NOTE — PROGRESS NOTES
Patient:  Marcie Almazan  YOB: 1970  MRN: [id number]       Chief Complaint/Active Symptoms:       Marcie Almazan is a 53 y.o. female who returns today for cardiac follow-up.  Patient was seen as a new patient several weeks ago with advanced coronary disease.  We did have the opportunity to review her cardiac catheterization from her [facility] institutions.  This was reviewed by Dr. Cespedes who determined that the patient had severe diffuse disease including her grafts and would not be a suitable candidate for any further catheter intervention.  Of note is that her ejection fraction which reportedly a year or so ago was normal and the 60+ percent range was 35 to 40% on echo done just before she saw me.  Patient was in the emergency room recently in the last day or 2 with chest pain but was discharged with the absence of any acute abnormalities.  We reviewed the information with the patient today.  Based on her findings intentions are to maximize medical therapy.  We will be increasing her Imdur to 120 daily.  Will also add Entresto intermediate dose twice daily.  Patient resides in [facility] and is good to be returning there in a few weeks.  Will obtain a cardiac MRI prior to this and hopefully follow-up with me as well if not a phone call to give her the results.  Will then determine the patient's follow-up here since this is a long trip for her to see us.  Again maximizing medical therapy.  If necessary EP consult can be obtained as well on upon her next visit.      Objective:     There were no vitals filed for this visit.    There were no vitals filed for this visit.    Allergies:     Allergies   Allergen Reactions    Clomipramine Diarrhea    Coconut Hives    Glimepiride Diarrhea    Buspar [Buspirone] Rash          Medications:     Current Outpatient Medications   Medication Instructions    aspirin 81 mg, oral, Daily    atorvastatin (LIPITOR) 80 mg, oral, Daily    BD Ultra-Fine Short Pen  "Needle 31 gauge x 5/16\" needle USE AS DIRECTED    clopidogrel (PLAVIX) 75 mg, oral, Daily    HumaLOG KwikPen Insulin 100 unit/mL injection As directed    hydrOXYzine pamoate (VISTARIL) 25 mg, oral, 3 times daily PRN    ibuprofen 800 mg, oral, 3 times daily, PRN    isosorbide mononitrate ER (IMDUR) 60 mg, oral, Daily    Lantus Solostar U-100 Insulin 100 unit/mL (3 mL) pen As directed    magnesium oxide (Mag-Ox) 400 mg (241.3 mg magnesium) tablet 1 tablet, oral, Nightly    methocarbamol (ROBAXIN) 500 mg, oral, 3 times daily PRN    metoprolol tartrate (LOPRESSOR) 100 mg, oral, 2 times daily    nitroglycerin (NITROSTAT) 0.4 mg, sublingual, Every 5 min PRN    omeprazole (PRILOSEC) 40 mg, oral, Daily    ondansetron (Zofran) 4 mg tablet TAKE 1 TAB BY MOUTH EVERY 6 HOURS A NEEDED    pregabalin (LYRICA) 75 mg, oral, 2 times daily    promethazine-DM (Phenergan-DM) 6.25-15 mg/5 mL syrup TAKE 2 TSP BY MOUTH EVERY 6 HOURS AS NEEDED    ranolazine (Ranexa) 500 mg 12 hr tablet TAKE 1 TABLET EXTENDED RELEASE 12 HOURS BY MOUTH TWICE A DAY    rOPINIRole (REQUIP) 8 mg, oral, Nightly    traMADol (ULTRAM) 50 mg, oral, Every 12 hours PRN    venlafaxine XR (EFFEXOR-XR) 150 mg, oral, Daily       Physical Examination:   Constitutional:       Appearance: Healthy appearance. Not in distress.   Neck:      Vascular: No JVR. JVD normal.   Pulmonary:      Effort: Pulmonary effort is normal.      Breath sounds: Normal breath sounds. No wheezing. No rhonchi. No rales.   Chest:      Chest wall: Not tender to palpatation.   Cardiovascular:      PMI at left midclavicular line. Normal rate. Regular rhythm. Normal S1. Normal S2.       Murmurs: There is no murmur.      No gallop.  No click. No rub.   Pulses:     Intact distal pulses.   Edema:     Peripheral edema absent.   Abdominal:      General: Bowel sounds are normal.      Palpations: Abdomen is soft.      Tenderness: There is no abdominal tenderness.   Musculoskeletal: Normal range of motion.         " "General: No tenderness. Skin:     General: Skin is warm and dry.   Neurological:      General: No focal deficit present.      Mental Status: Alert and oriented to person, place and time.            Lab:     CBC:   Lab Results   Component Value Date    WBC 10.5 01/29/2024    RBC 4.55 01/29/2024    HGB 13.6 01/29/2024    HCT 39.9 01/29/2024     01/29/2024        CMP:    Lab Results   Component Value Date     01/29/2024    K 3.9 01/29/2024     01/29/2024    CO2 24 01/29/2024    BUN 8 01/29/2024    CREATININE 0.64 01/29/2024    GLUCOSE 171 (H) 01/29/2024    CALCIUM 8.8 01/29/2024       Magnesium:    Lab Results   Component Value Date    MG 1.60 01/29/2024       Lipid Profile:    No results found for: \"CHLPL\", \"TRIG\", \"HDL\", \"LDLCALC\", \"LDLDIRECT\"    TSH:    No results found for: \"TSH\"    BNP:   Lab Results   Component Value Date     (H) 01/29/2024        PT/INR:    No results found for: \"PROTIME\", \"INR\"    HgBA1c:    No results found for: \"HGBA1C\"    BMP:  Lab Results   Component Value Date     01/29/2024    K 3.9 01/29/2024     01/29/2024    CO2 24 01/29/2024    BUN 8 01/29/2024    CREATININE 0.64 01/29/2024       CBC:  Lab Results   Component Value Date    WBC 10.5 01/29/2024    RBC 4.55 01/29/2024    HGB 13.6 01/29/2024    HCT 39.9 01/29/2024    MCV 88 01/29/2024    MCH 29.9 01/29/2024    MCHC 34.1 01/29/2024    RDW 14.0 01/29/2024     01/29/2024       Cardiac Enzymes:    Lab Results   Component Value Date    TROPHS 4 01/29/2024    TROPHS 3 01/29/2024       Hepatic Function Panel:    Lab Results   Component Value Date    ALKPHOS 100 01/29/2024    ALT 5 (L) 01/29/2024    AST 8 (L) 01/29/2024    PROT 6.9 01/29/2024    BILITOT 0.4 01/29/2024         Diagnostic Studies:     ECG 12 lead    Result Date: 1/29/2024  Normal sinus rhythm Possible Anterior infarct , age undetermined Abnormal ECG When compared with ECG of 29-JAN-2024 13:58, (unconfirmed) Borderline criteria for " "Anterior infarct are now Present    ECG 12 lead    Result Date: 1/29/2024  Normal sinus rhythm Normal ECG When compared with ECG of 29-JAN-2024 12:19, (unconfirmed) No significant change was found    XR chest 2 views    Result Date: 1/29/2024  Interpreted By:  Skip Ontiveros, STUDY: XR CHEST 2 VIEWS;  1/29/2024 1:45 pm   INDICATION: Signs/Symptoms:Chest Pain.   COMPARISON: None.   ACCESSION NUMBER(S): [id number]   ORDERING CLINICIAN: YUE OCAMPO   FINDINGS: Post sternotomy. The lungs are clear without pleural effusion. Mild cardiomegaly. Otherwise unremarkable mediastinum, sara, and pulmonary vasculature.       No active disease in the chest.     Signed by: Skip Ontiveros 1/29/2024 1:48 PM Dictation workstation:   [id number]    ECG 12 lead    Result Date: 1/29/2024  Normal sinus rhythm Normal ECG No previous ECGs available      EKG:   No results found for: \"EKG\"      Radiology:     No orders to display       Assessment/Plan:         Patient Active Problem List   Diagnosis    CAD (coronary artery disease)    COPD (chronic obstructive pulmonary disease) (CMS/HCC)    DM (diabetes mellitus) (CMS/[facility])    HLD (hyperlipidemia)    HTN (hypertension)    Hx of CABG    Obesity (BMI 30.0-34.9)    Current smoker         ASSESSMENT   53-year-old female seen and evaluated today at her own request due to advanced cardiovascular disease for additional opinion and management.     Meds, vitals, examination as noted.     Chart reviewed in detail and discussed with the patient and family.     Impression:  ASHD class III  Remote multivessel angioplasty and stenting since 2017  Recent 5 vessel CABG [facility] in [place] August 2023 details not known  Reported ischemic cardiomyopathy with 35% ejection fraction  Reported valve disease with mild  TR and AI  Insulin-dependent diabetes mellitus  Obesity  Hypertension  Hyperlipidemia      PLAN   Recommendation:  Increase Imdur to 120 daily  Add Entresto intermediate dose " twice daily  Cardiac MRI  See me after testing to review  Possible EP evaluation  Ongoing medical therapy  Guarded prognosis long-term

## 2024-01-30 NOTE — PATIENT INSTRUCTIONS
Continue same medications/treatment.  Patient educated on proper medication use.  Patient educated on risk factor modification.  Please bring any lab results from other providers/physicians to your next appointment.    Please bring all medicines, vitamins, and herbal supplements with you when you come to the office.    Prescriptions will not be filled unless you are compliant with your follow up appointments or have a follow up appointment scheduled as per instruction of your physician. Refills should be requested at the time of your visit.    Follow up after testing  MRI to be done   Lab work to be done prior to MRI  Start Entresto 49-51 mg 2x daily  INCREASE Imdur to 120 mg daily    ITANJA RN, AM SCRIBING FOR AND IN THE PRESENCE OF DR. JONATHON BURNS, DO, FACC

## 2024-01-31 ENCOUNTER — TELEPHONE (OUTPATIENT)
Dept: CARDIOLOGY | Facility: HOSPITAL | Age: 54
End: 2024-01-31
Payer: COMMERCIAL

## 2024-01-31 NOTE — TELEPHONE ENCOUNTER
PA for Entresto 49-51mg BID started today via EPIC.     Questions answered. Will await determination.

## 2024-02-06 NOTE — TELEPHONE ENCOUNTER
Sending via fax records from previous hospital and our last 2 OV's to [facility] at [phone] for appeal.

## 2024-02-12 ENCOUNTER — TELEPHONE (OUTPATIENT)
Dept: CARDIOLOGY | Facility: CLINIC | Age: 54
End: 2024-02-12
Payer: COMMERCIAL

## 2024-02-12 NOTE — TELEPHONE ENCOUNTER
Patricia from Dr. Lin office states that pt is not able to get her Entresto at her local pharmacy.  Per Patricia pt states that she needs a prior auth for this medications.    Patricia states that pt local pharmacy is [facility] at [phone].      Patricia is asking for return call.    Routed to Rhonda PATRICIA RN

## 2024-02-12 NOTE — TELEPHONE ENCOUNTER
Patricia from Dr. Christiano Lin's office called again about this.  She is trying to help the patient get the Entresto 49-51.  She would like to confirm the dose and instructions, so she can help with this PA if you need her to    Routed to Rhonda PATRICIA for review

## 2024-02-13 NOTE — TELEPHONE ENCOUNTER
Patient called today and I let her know that Entresto was denied and on 2/6/24 I sent in an appeal for this. I notified patient of this and she will follow up with Donny and see what the status is.

## 2024-02-13 NOTE — TELEPHONE ENCOUNTER
I can not find Dr. Lin's phone number. I tried multiple times to reach the patient to see if she had a contact number for her physician and no voicemail came on.

## 2024-02-13 NOTE — TELEPHONE ENCOUNTER
Pt left message that she needs a PA for Entresto as her insurance will not cover it without one.  Pt states that she has called office multiple times and no-one is calling her back in regards to this.      As per note Dr. Lin's office number # is [phone] (listed under incoming call-contact).    Routed to Rhonda PATRICIA RN

## 2024-02-19 ENCOUNTER — TELEPHONE (OUTPATIENT)
Dept: CARDIOLOGY | Facility: CLINIC | Age: 54
End: 2024-02-19
Payer: COMMERCIAL

## 2024-02-22 ENCOUNTER — HOSPITAL ENCOUNTER (OUTPATIENT)
Dept: RADIOLOGY | Facility: CLINIC | Age: 54
Discharge: HOME | End: 2024-02-22
Payer: COMMERCIAL

## 2024-02-22 DIAGNOSIS — I25.5 CARDIOMYOPATHY, ISCHEMIC: ICD-10-CM

## 2024-02-22 PROCEDURE — 75565 CARD MRI VELOC FLOW MAPPING: CPT | Performed by: INTERNAL MEDICINE

## 2024-02-22 PROCEDURE — 2550000001 HC RX 255 CONTRASTS: Performed by: INTERNAL MEDICINE

## 2024-02-22 PROCEDURE — A9575 INJ GADOTERATE MEGLUMI 0.1ML: HCPCS | Performed by: INTERNAL MEDICINE

## 2024-02-22 PROCEDURE — 75561 CARDIAC MRI FOR MORPH W/DYE: CPT | Performed by: INTERNAL MEDICINE

## 2024-02-22 PROCEDURE — 75565 CARD MRI VELOC FLOW MAPPING: CPT

## 2024-02-22 PROCEDURE — 75561 CARDIAC MRI FOR MORPH W/DYE: CPT

## 2024-02-22 RX ORDER — GADOTERATE MEGLUMINE 376.9 MG/ML
39 INJECTION INTRAVENOUS
Status: COMPLETED | OUTPATIENT
Start: 2024-02-22 | End: 2024-02-22

## 2024-02-22 RX ADMIN — GADOTERATE MEGLUMINE 39 ML: 376.9 INJECTION INTRAVENOUS at 10:08

## 2024-02-26 ENCOUNTER — OFFICE VISIT (OUTPATIENT)
Dept: CARDIOLOGY | Facility: CLINIC | Age: 54
End: 2024-02-26
Payer: COMMERCIAL

## 2024-02-26 VITALS
WEIGHT: 193 LBS | HEART RATE: 62 BPM | SYSTOLIC BLOOD PRESSURE: 140 MMHG | BODY MASS INDEX: 32.15 KG/M2 | DIASTOLIC BLOOD PRESSURE: 80 MMHG | HEIGHT: 65 IN

## 2024-02-26 DIAGNOSIS — I25.110 CORONARY ARTERY DISEASE INVOLVING NATIVE CORONARY ARTERY OF NATIVE HEART WITH UNSTABLE ANGINA PECTORIS (MULTI): ICD-10-CM

## 2024-02-26 DIAGNOSIS — E78.2 MIXED HYPERLIPIDEMIA: ICD-10-CM

## 2024-02-26 DIAGNOSIS — Z95.1 HX OF CABG: ICD-10-CM

## 2024-02-26 DIAGNOSIS — F17.200 CURRENT SMOKER: ICD-10-CM

## 2024-02-26 DIAGNOSIS — E11.9 TYPE 2 DIABETES MELLITUS WITHOUT COMPLICATION, UNSPECIFIED WHETHER LONG TERM INSULIN USE (MULTI): ICD-10-CM

## 2024-02-26 DIAGNOSIS — I10 PRIMARY HYPERTENSION: ICD-10-CM

## 2024-02-26 PROCEDURE — 3079F DIAST BP 80-89 MM HG: CPT | Performed by: INTERNAL MEDICINE

## 2024-02-26 PROCEDURE — 99215 OFFICE O/P EST HI 40 MIN: CPT | Performed by: INTERNAL MEDICINE

## 2024-02-26 PROCEDURE — 3077F SYST BP >= 140 MM HG: CPT | Performed by: INTERNAL MEDICINE

## 2024-02-26 PROCEDURE — 3008F BODY MASS INDEX DOCD: CPT | Performed by: INTERNAL MEDICINE

## 2024-02-26 NOTE — PROGRESS NOTES
"  Patient:  Marcie Almazan  YOB: 1970  MRN: [id number]       Chief Complaint/Active Symptoms:       Marcie Almazan is a 53 y.o. female who returns today for cardiac follow-up.  Patient has had both surgical and interventional revascularizations which we were here reviewing for her recently since she resides in [facility].  Her catheter was reviewed by Dr. Cespedes who did not feel there were any areas for approachable or reasonable intervention and that medical management should be continued.  We did have her do an MRI which amazingly has shown her ejection fraction now is in the 66% range.  She has some minor scarring but nothing that would suggest substantial cardiac dysfunction.  She still has some atypical symptoms and goes to her local emergency room on occasion where she was told that all her cardiac studies were fine and this is likely GI in nature.  We did advise that she seek out a local gastroenterologist for thorough evaluation.  Today her vitals are stable.  Heart and lung sound clear.  She has no signs of congestion and is not having any typical angina..      Objective:     Vitals:    02/26/24 1322   BP: 140/80   Pulse: 62       Vitals:    02/26/24 1322   Weight: 87.5 kg (193 lb)       Allergies:     Allergies   Allergen Reactions    Clomipramine Diarrhea    Coconut Hives    Glimepiride Diarrhea    Buspar [Buspirone] Rash          Medications:     Current Outpatient Medications   Medication Instructions    aspirin 81 mg, oral, Daily    atorvastatin (LIPITOR) 80 mg, oral, Daily    BD Ultra-Fine Short Pen Needle 31 gauge x 5/16\" needle USE AS DIRECTED    clonazePAM (KLONOPIN) 0.5 mg, oral, 2 times daily PRN    clopidogrel (PLAVIX) 75 mg, oral, Daily    HumaLOG KwikPen Insulin 100 unit/mL injection As directed    ibuprofen 800 mg, oral, 3 times daily, PRN    isosorbide mononitrate ER (IMDUR) 120 mg, oral, Daily    Lantus Solostar U-100 Insulin 100 unit/mL (3 mL) pen As directed    magnesium " oxide (Mag-Ox) 400 mg (241.3 mg magnesium) tablet 1 tablet, oral, Nightly    methocarbamol (ROBAXIN) 500 mg, oral, 3 times daily PRN    metoprolol tartrate (LOPRESSOR) 100 mg, oral, 2 times daily    nitroglycerin (NITROSTAT) 0.4 mg, sublingual, Every 5 min PRN    omeprazole (PRILOSEC) 40 mg, oral, Daily    ondansetron (Zofran) 4 mg tablet TAKE 1 TAB BY MOUTH EVERY 6 HOURS A NEEDED    oxygen (O2) gas therapy oxygen active 2.5 L at night Not Available Not Available Not Available    pregabalin (LYRICA) 150 mg, oral, 2 times daily    ranolazine (Ranexa) 500 mg 12 hr tablet TAKE 1 TABLET EXTENDED RELEASE 12 HOURS BY MOUTH TWICE A DAY    rOPINIRole (REQUIP) 8 mg, oral, Nightly    sacubitriL-valsartan (Entresto) 49-51 mg tablet 1 tablet, oral, 2 times daily    traMADol (ULTRAM) 50 mg, oral, Every 12 hours PRN    venlafaxine XR (EFFEXOR-XR) 150 mg, oral, Daily       Physical Examination:   Constitutional:       Appearance: Healthy appearance. Not in distress.   Neck:      Vascular: No JVR. JVD normal.   Pulmonary:      Effort: Pulmonary effort is normal.      Breath sounds: Normal breath sounds. No wheezing. No rhonchi. No rales.   Chest:      Chest wall: Not tender to palpatation.   Cardiovascular:      PMI at left midclavicular line. Normal rate. Regular rhythm. Normal S1. Normal S2.       Murmurs: There is a grade 1/6 blowing midsystolic murmur at the LLSB and ULSB.      No gallop.  No click. No rub.      Comments: Well-healed sternotomy scar noted  Pulses:     Intact distal pulses.   Edema:     Peripheral edema absent.   Abdominal:      General: Bowel sounds are normal.      Palpations: Abdomen is soft.      Tenderness: There is no abdominal tenderness.   Musculoskeletal: Normal range of motion.         General: No tenderness. Skin:     General: Skin is warm and dry.   Neurological:      General: No focal deficit present.      Mental Status: Alert and oriented to person, place and time.            Lab:     CBC:   Lab  "Results   Component Value Date    WBC 10.5 01/29/2024    RBC 4.55 01/29/2024    HGB 13.6 01/29/2024    HCT 39.9 01/29/2024     01/29/2024        CMP:    Lab Results   Component Value Date     01/29/2024    K 3.9 01/29/2024     01/29/2024    CO2 24 01/29/2024    BUN 8 01/29/2024    CREATININE 0.64 01/29/2024    GLUCOSE 171 (H) 01/29/2024    CALCIUM 8.8 01/29/2024       Magnesium:    Lab Results   Component Value Date    MG 1.60 01/29/2024       Lipid Profile:    No results found for: \"CHLPL\", \"TRIG\", \"HDL\", \"LDLCALC\", \"LDLDIRECT\"    TSH:    No results found for: \"TSH\"    BNP:   Lab Results   Component Value Date     (H) 01/29/2024        PT/INR:    No results found for: \"PROTIME\", \"INR\"    HgBA1c:    No results found for: \"HGBA1C\"    BMP:  Lab Results   Component Value Date     01/29/2024    K 3.9 01/29/2024     01/29/2024    CO2 24 01/29/2024    BUN 8 01/29/2024    CREATININE 0.64 01/29/2024       CBC:  Lab Results   Component Value Date    WBC 10.5 01/29/2024    RBC 4.55 01/29/2024    HGB 13.6 01/29/2024    HCT 39.9 01/29/2024    MCV 88 01/29/2024    MCH 29.9 01/29/2024    MCHC 34.1 01/29/2024    RDW 14.0 01/29/2024     01/29/2024       Cardiac Enzymes:    Lab Results   Component Value Date    TROPHS 4 01/29/2024    TROPHS 3 01/29/2024       Hepatic Function Panel:    Lab Results   Component Value Date    ALKPHOS 100 01/29/2024    ALT 5 (L) 01/29/2024    AST 8 (L) 01/29/2024    PROT 6.9 01/29/2024    BILITOT 0.4 01/29/2024         Diagnostic Studies:     MR cardiac morphology and function w and wo IV contrast    Result Date: 2/23/2024  Interpreted By:  Elbert Galloway, STUDY: MR CARDIAC RESONANCE IMAGING FOR VELOCITY FLOW MAPPING; MR CARDIAC MORPHOLOGY AND FUNCTION W AND WO IV CONTRAST;  2/22/2024 10:06 am   INDICATION: Signs/Symptoms:ICM; Signs/Symptoms:Ischemic Cardiomyopathy.   This study is performed to assess myocardial viability and damage, and to quantitate left " ventricular and valvular function.   COMPARISON: None.   ACCESSION NUMBER(S): [id number]; [id number]   ORDERING CLINICIAN: JONATHON BURNS   TECHNIQUE: [employer]ak 1.5 Erma MRI scanner. Turbo spin echo and balanced steady state free precession (bSSFP) imaging for anatomic definition. Dynamic cine bSSFP for cardiac chamber and wall-motion analysis, and valvular analysis. Flow quantification sequences for hemodynamics. Delayed gadolinium enhancement analysis after injection of gadolinium-chelate (40 mL of Dotarem, 0.22 mmol/kg).   HT-65 inches, 165 cm; WT-197 pounds, 89 kg; BSA-1.97 m2   FINDINGS: CARDIAC CHAMBERS Normal atrioventricular and ventriculoarterial concordance   LEFT ATRIUM Dilated 4.3 cm (Area-18 cm2)   RIGHT ATRIUM Normal size (Area-16 cm2)   INTERATRIAL SEPTUM Possible patent foramina ovale can not be excluded.   LEFT VENTRICLE Normal left ventricular cavity size with preserved systolic function. Mild hypokinesis of the basal inferior wall. Asymmetric septal left ventricular hypertrophy. Basal anterior septum 1.4 cm. Quantitative left ventricular functional values are as follows: EDV = 115 cc; EDVi = 58 cc/m2 ESV = 39 cc; ESVi = 20 cc/m2 Stroke volume = 76 cc; SVi = 38 cc/m2 LVEF = 66 % Absolute Cardiac Output = 5.3 l/min.; COi = 2.7 l/min/m2 LV mass = 136 gm; LVMi = 69 gm/m2   *Marysol AM et al. Normalized left ventricular systolic and diastolic function by steady state free precession cardiovascular magnetic resonance. J Cardiovasc Magn Reson 2006; 8:417-26.   Delayed enhancement imaging reveals small non transmural myocardial infarction of the basal inferior wall.   RIGHT VENTRICLE The right ventricle appears normal in size, shape, and has normal qualitative systolic function. No segmental wall motion abnormalities. No abnormal delayed enhancement in the myocardium. RV EF 57%.   INTERVENTRICULAR SEPTUM Intact.   AORTIC VALVE Trileaflet aortic valve without stenosis. There is  trace aortic  regurgitation. Flow quantification through the ascending aorta: Forward volume =40 cc/beat Reverse volume = 3 cc/beat Net forward volume = 36 cc/beat     MITRAL VALVE There is  trace mitral regurgitation.   TRICUSPID VALVE There is qualitative trace tricuspid regurgitation.   Flow assessment QP 5 L per minute. QS 5.3 liters/minute QP/QS 1.0   THORACIC AORTA The thoracic aorta appears normal in course, caliber, and contour. There is no evidence for acute aortic pathology. The arch vessel branching pattern is  normal.   All the arch branch vessels appear widely patent in their proximal portions.   PULMONARY ARTERIES The central pulmonary arteries appear  normal   SYSTEMIC AND PULMONARY VEINS Normal systemic venous and pulmonary venous return. The SVC and IVC are of normal caliber. Normal pulmonary venous anatomy.   CHEST The chest wall is normal. No significant lymphadenopathy or mass is seen in limited images of the mediastinum. Limited imaging through the lungs reveals no gross abnormalities. No pleural effusion.   UPPER ABDOMEN Limited imaging through the upper abdomen reveals no abnormalities of the visualized organs.       1.  Normal left ventricular cavity size with preserved systolic function. Ejection fraction 66%. Mild basal inferior wall hypokinesis. 2.  Delayed enhancement imaging reveals small non transmural basal inferior wall myocardial infarction. 3.  Normal right ventricular cavity size with preserved systolic function. RV EF 57%.     MACRO: None   Signed by: Elbert Galloway 2/23/2024 3:01 PM Dictation workstation:   [id number]    MR cardiac resonance imaging for velocity flow mapping    Result Date: 2/23/2024  Interpreted By:  Elbert Galloway, STUDY: MR CARDIAC RESONANCE IMAGING FOR VELOCITY FLOW MAPPING; MR CARDIAC MORPHOLOGY AND FUNCTION W AND WO IV CONTRAST;  2/22/2024 10:06 am   INDICATION: Signs/Symptoms:ICM; Signs/Symptoms:Ischemic Cardiomyopathy.   This study is performed to assess myocardial  viability and damage, and to quantitate left ventricular and valvular function.   COMPARISON: None.   ACCESSION NUMBER(S): [id number]; [id number]   ORDERING CLINICIAN: JONATHON BURNS   TECHNIQUE: Novak 1.5 Erma MRI scanner. Turbo spin echo and balanced steady state free precession (bSSFP) imaging for anatomic definition. Dynamic cine bSSFP for cardiac chamber and wall-motion analysis, and valvular analysis. Flow quantification sequences for hemodynamics. Delayed gadolinium enhancement analysis after injection of gadolinium-chelate (40 mL of Dotarem, 0.22 mmol/kg).   HT-65 inches, 165 cm; WT-197 pounds, 89 kg; BSA-1.97 m2   FINDINGS: CARDIAC CHAMBERS Normal atrioventricular and ventriculoarterial concordance   LEFT ATRIUM Dilated 4.3 cm (Area-18 cm2)   RIGHT ATRIUM Normal size (Area-16 cm2)   INTERATRIAL SEPTUM Possible patent foramina ovale can not be excluded.   LEFT VENTRICLE Normal left ventricular cavity size with preserved systolic function. Mild hypokinesis of the basal inferior wall. Asymmetric septal left ventricular hypertrophy. Basal anterior septum 1.4 cm. Quantitative left ventricular functional values are as follows: EDV = 115 cc; EDVi = 58 cc/m2 ESV = 39 cc; ESVi = 20 cc/m2 Stroke volume = 76 cc; SVi = 38 cc/m2 LVEF = 66 % Absolute Cardiac Output = 5.3 l/min.; COi = 2.7 l/min/m2 LV mass = 136 gm; LVMi = 69 gm/m2   *Marysol AM et al. Normalized left ventricular systolic and diastolic function by steady state free precession cardiovascular magnetic resonance. J Cardiovasc Magn Reson 2006; 8:417-26.   Delayed enhancement imaging reveals small non transmural myocardial infarction of the basal inferior wall.   RIGHT VENTRICLE The right ventricle appears normal in size, shape, and has normal qualitative systolic function. No segmental wall motion abnormalities. No abnormal delayed enhancement in the myocardium. RV EF 57%.   INTERVENTRICULAR SEPTUM Intact.   AORTIC VALVE Trileaflet aortic valve  "without stenosis. There is  trace aortic regurgitation. Flow quantification through the ascending aorta: Forward volume =40 cc/beat Reverse volume = 3 cc/beat Net forward volume = 36 cc/beat     MITRAL VALVE There is  trace mitral regurgitation.   TRICUSPID VALVE There is qualitative trace tricuspid regurgitation.   Flow assessment QP 5 L per minute. QS 5.3 liters/minute QP/QS 1.0   THORACIC AORTA The thoracic aorta appears normal in course, caliber, and contour. There is no evidence for acute aortic pathology. The arch vessel branching pattern is  normal.   All the arch branch vessels appear widely patent in their proximal portions.   PULMONARY ARTERIES The central pulmonary arteries appear  normal   SYSTEMIC AND PULMONARY VEINS Normal systemic venous and pulmonary venous return. The SVC and IVC are of normal caliber. Normal pulmonary venous anatomy.   CHEST The chest wall is normal. No significant lymphadenopathy or mass is seen in limited images of the mediastinum. Limited imaging through the lungs reveals no gross abnormalities. No pleural effusion.   UPPER ABDOMEN Limited imaging through the upper abdomen reveals no abnormalities of the visualized organs.       1.  Normal left ventricular cavity size with preserved systolic function. Ejection fraction 66%. Mild basal inferior wall hypokinesis. 2.  Delayed enhancement imaging reveals small non transmural basal inferior wall myocardial infarction. 3.  Normal right ventricular cavity size with preserved systolic function. RV EF 57%.     MACRO: None   Signed by: Elbert Galloway 2/23/2024 3:01 PM Dictation workstation:   [id number]      EKG:   No results found for: \"EKG\"      Radiology:     No orders to display       Assessment/Plan:         Patient Active Problem List   Diagnosis    CAD (coronary artery disease)    COPD (chronic obstructive pulmonary disease) (CMS/[facility])    DM (diabetes mellitus) (CMS/[facility])    HLD (hyperlipidemia)    HTN (hypertension)    Hx of CABG "    Obesity (BMI 30.0-34.9)    Current smoker    BMI 32.0-32.9,adult         ASSESSMENT         53-year-old female seen and evaluated in follow-up today.    Meds, vitals, examination as noted    Chart reviewed in detail discussed with the patient at length including testing results and other findings and recommendations for noncardiac evaluation in her local hospital region.    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

## 2024-02-26 NOTE — PATIENT INSTRUCTIONS
Continue same medications/treatment.  Patient educated on proper medication use.  Patient educated on risk factor modification.  Please bring any lab results from other providers/physicians to your next appointment.    Please bring all medicines, vitamins, and herbal supplements with you when you come to the office.    Prescriptions will not be filled unless you are compliant with your follow up appointments or have a follow up appointment scheduled as per instruction of your physician. Refills should be requested at the time of your visit.    Follow up in June I, TANJA MESSINA RN, AM SCRIBING FOR AND IN THE PRESENCE OF DR. JONATHON BURNS, DO, FACC

## 2024-03-11 ENCOUNTER — TELEPHONE (OUTPATIENT)
Dept: CARDIOLOGY | Facility: CLINIC | Age: 54
End: 2024-03-11
Payer: COMMERCIAL

## 2024-03-11 NOTE — TELEPHONE ENCOUNTER
Patient dropped off [employer] paperwork to be signed by Dr. Elbert Renner DO. This was done and faxed to them as requested. I L/M for patient to call back and let us know if she wants signed copies mailed back to her.

## 2024-06-24 ENCOUNTER — APPOINTMENT (OUTPATIENT)
Dept: CARDIOLOGY | Facility: CLINIC | Age: 54
End: 2024-06-24
Payer: COMMERCIAL